# Patient Record
Sex: FEMALE | URBAN - METROPOLITAN AREA
[De-identification: names, ages, dates, MRNs, and addresses within clinical notes are randomized per-mention and may not be internally consistent; named-entity substitution may affect disease eponyms.]

---

## 2024-05-22 ENCOUNTER — APPOINTMENT (RX ONLY)
Dept: URBAN - METROPOLITAN AREA CLINIC 329 | Facility: CLINIC | Age: 29
Setting detail: DERMATOLOGY
End: 2024-05-22

## 2024-05-22 DIAGNOSIS — B07.8 OTHER VIRAL WARTS: ICD-10-CM | Status: INADEQUATELY CONTROLLED

## 2024-05-22 PROCEDURE — ? LIQUID NITROGEN

## 2024-05-22 PROCEDURE — ? ADDITIONAL NOTES

## 2024-05-22 PROCEDURE — ? FULL BODY SKIN EXAM - DECLINED

## 2024-05-22 PROCEDURE — 17110 DESTRUCTION B9 LES UP TO 14: CPT

## 2024-05-22 PROCEDURE — ? TREATMENT REGIMEN

## 2024-05-22 PROCEDURE — ? COUNSELING

## 2024-05-22 ASSESSMENT — LOCATION DETAILED DESCRIPTION DERM
LOCATION DETAILED: LEFT DISTAL RADIAL DORSAL SMALL FINGER
LOCATION DETAILED: LEFT DISTAL RADIAL DORSAL RING FINGER

## 2024-05-22 ASSESSMENT — LOCATION SIMPLE DESCRIPTION DERM
LOCATION SIMPLE: LEFT SMALL FINGER
LOCATION SIMPLE: LEFT RING FINGER

## 2024-05-22 ASSESSMENT — LOCATION ZONE DERM: LOCATION ZONE: FINGER

## 2024-05-22 NOTE — HPI: SKIN LESION
What Type Of Note Output Would You Prefer (Optional)?: Bullet Format
How Severe Is Your Skin Lesion?: mild
Is This A New Presentation, Or A Follow-Up?: Skin Lesions
Additional History: Patient states she has two spots on her fingers. She states they have been present for almost a year. She felt like it was a fungal infection and was using otc anti-fungals but they haven’t helped. She denies any itching but states they are painful at times.

## 2024-05-22 NOTE — PROCEDURE: LIQUID NITROGEN
Post-Care Instructions: I reviewed with the patient in detail post-care instructions. Patient is to wear sunprotection, and avoid picking at any of the treated lesions. Pt may apply Vaseline to crusted or scabbing areas.
Detail Level: Detailed
Include Z78.9 (Other Specified Conditions Influencing Health Status) As An Associated Diagnosis?: No
Show Aperture Variable?: Yes
Medical Necessity Clause: This procedure was medically necessary because the lesions that were treated were:
Medical Necessity Information: It is in your best interest to select a reason for this procedure from the list below. All of these items fulfill various CMS LCD requirements except the new and changing color options.
Consent: The patient's consent was obtained including but not limited to risks of crusting, scabbing, blistering, scarring, darker or lighter pigmentary change, recurrence, incomplete removal and infection.
Spray Paint Text: The liquid nitrogen was applied to the skin utilizing a spray paint frosting technique.

## 2024-05-22 NOTE — PROCEDURE: ADDITIONAL NOTES
Render Risk Assessment In Note?: no
Detail Level: Simple
Additional Notes: Discussed treatment options and stated that all treatment options typically take multiple treatments to get lesions to resolve. Patient expressed understanding.

## 2024-07-24 ENCOUNTER — APPOINTMENT (RX ONLY)
Dept: URBAN - METROPOLITAN AREA CLINIC 329 | Facility: CLINIC | Age: 29
Setting detail: DERMATOLOGY
End: 2024-07-24

## 2024-07-24 DIAGNOSIS — B07.8 OTHER VIRAL WARTS: ICD-10-CM

## 2024-07-24 PROCEDURE — ? LIQUID NITROGEN

## 2024-07-24 PROCEDURE — ? COUNSELING

## 2024-07-24 PROCEDURE — ? ADDITIONAL NOTES

## 2024-07-24 PROCEDURE — ? TREATMENT REGIMEN

## 2024-07-24 PROCEDURE — 17110 DESTRUCTION B9 LES UP TO 14: CPT

## 2024-07-24 PROCEDURE — ? FULL BODY SKIN EXAM - DECLINED

## 2024-07-24 ASSESSMENT — LOCATION SIMPLE DESCRIPTION DERM
LOCATION SIMPLE: LEFT SMALL FINGER
LOCATION SIMPLE: LEFT RING FINGER

## 2024-07-24 ASSESSMENT — LOCATION DETAILED DESCRIPTION DERM
LOCATION DETAILED: LEFT DISTAL RADIAL DORSAL RING FINGER
LOCATION DETAILED: LEFT DISTAL RADIAL DORSAL SMALL FINGER

## 2024-07-24 ASSESSMENT — LOCATION ZONE DERM: LOCATION ZONE: FINGER

## 2024-08-27 ENCOUNTER — APPOINTMENT (RX ONLY)
Dept: URBAN - METROPOLITAN AREA CLINIC 329 | Facility: CLINIC | Age: 29
Setting detail: DERMATOLOGY
End: 2024-08-27

## 2024-08-27 DIAGNOSIS — B07.8 OTHER VIRAL WARTS: ICD-10-CM

## 2024-08-27 PROCEDURE — ? TREATMENT REGIMEN

## 2024-08-27 PROCEDURE — ? COUNSELING

## 2024-08-27 PROCEDURE — 17110 DESTRUCTION B9 LES UP TO 14: CPT

## 2024-08-27 PROCEDURE — ? LIQUID NITROGEN

## 2024-08-27 PROCEDURE — ? FULL BODY SKIN EXAM - DECLINED

## 2024-08-27 PROCEDURE — ? ADDITIONAL NOTES

## 2024-08-27 ASSESSMENT — LOCATION SIMPLE DESCRIPTION DERM
LOCATION SIMPLE: LEFT SMALL FINGER
LOCATION SIMPLE: LEFT RING FINGER

## 2024-08-27 ASSESSMENT — LOCATION ZONE DERM: LOCATION ZONE: FINGER
